# Patient Record
Sex: MALE | Race: WHITE | NOT HISPANIC OR LATINO | URBAN - METROPOLITAN AREA
[De-identification: names, ages, dates, MRNs, and addresses within clinical notes are randomized per-mention and may not be internally consistent; named-entity substitution may affect disease eponyms.]

---

## 2024-10-11 ENCOUNTER — EMERGENCY (EMERGENCY)
Facility: HOSPITAL | Age: 19
LOS: 0 days | Discharge: HOME | End: 2024-10-11
Attending: PEDIATRICS
Payer: COMMERCIAL

## 2024-10-11 VITALS
OXYGEN SATURATION: 97 % | HEART RATE: 99 BPM | SYSTOLIC BLOOD PRESSURE: 141 MMHG | TEMPERATURE: 97 F | RESPIRATION RATE: 18 BRPM | DIASTOLIC BLOOD PRESSURE: 80 MMHG
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
## 2024-10-11 DIAGNOSIS — Z04.89 ENCOUNTER FOR EXAMINATION AND OBSERVATION FOR OTHER SPECIFIED REASONS: ICD-10-CM

## 2024-10-11 DIAGNOSIS — R41.0 DISORIENTATION, UNSPECIFIED: ICD-10-CM
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
## 2024-10-11 PROCEDURE — 99282 EMERGENCY DEPT VISIT SF MDM: CPT

## 2024-10-11 PROCEDURE — 99283 EMERGENCY DEPT VISIT LOW MDM: CPT
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
## 2024-10-11 NOTE — ED PROVIDER NOTE - PHYSICAL EXAMINATION
GENERAL: Well-developed; well-nourished; in no acute distress.   SKIN: warm, dry, no ecchymosis or erythema   HEAD: Normocephalic; atraumatic.  EYES: 2mm pupils, PERRLA, EOMI, no conjunctival erythema  ENT: No nasal discharge; airway clear. No voice changes.   NECK: Supple; non tender. no crepitus  CARD: Regular rate and rhythm. S1, S2 normal; no murmurs, gallops, or rubs.   RESP: LCTAB; No wheezes, rales, rhonchi, or stridor.  NEURO: A/ox3, grossly unremarkable  PSYCH: Cooperative, appropriate.
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
## 2024-10-11 NOTE — ED PROVIDER NOTE - PATIENT PORTAL LINK FT
You can access the FollowMyHealth Patient Portal offered by [facility] by registering at the following website: [email or web address]. By joining [facility]’s FollowMyHealth portal, you will also be able to view your health information using other applications (apps) compatible with our system.
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
## 2025-01-30 NOTE — ED PROVIDER NOTE - NS ED ATTENDING STATEMENT MOD
1) I will change your lantus to 25 units at bedtime.    2) I will change your NPH to 50 units in the morning at the same time as your 30 mg of prednisone.    3) I have sent an updated novolog scale to [facility] and will fax the orders to [facility] to dose your novolog as follows:    Check blood sugars 3 times daily before each meal and adjust novolog based on the scale below:  Blood sugar                 Insulin dose                                                                                           Less than 90               Eat first, 8 units                                                10 units                         201-250                       12 units                                     251-300                       14 units                                                 301-350                       16 units                                                 351-400                       18 units  401-450                       20 units  451-500                       22 units  Over 500                     24 units    4) I will have you stay on ozempic 0.5 mg every Wednesday.    5) I sent your prescription for Kimberly 3+ sensors and a  to [facility] in [place] (phone: [phone]) who will process your order and reach out to me for paperwork to get you approved and the supplies will be sent to your house.      You will apply a new sensor every 15 days.  Try to check up to 6 times daily before and 2 hours after each meal so you can see the effect of food on your sugar.  If your sugar is going over 180 2 hours after you eat, then you either need to cut back on your food or we may need to slightly increase your humalog to cover the food.    If this needs to go somewhere else like [facility], then let me know.    6) I will restart your rosuvastatin and ezetimibe for cholesterol.   Attending with